# Patient Record
Sex: FEMALE
[De-identification: names, ages, dates, MRNs, and addresses within clinical notes are randomized per-mention and may not be internally consistent; named-entity substitution may affect disease eponyms.]

---

## 2019-01-02 PROBLEM — Z00.00 ENCOUNTER FOR PREVENTIVE HEALTH EXAMINATION: Status: ACTIVE | Noted: 2019-01-02

## 2019-01-22 ENCOUNTER — APPOINTMENT (OUTPATIENT)
Dept: PULMONOLOGY | Facility: CLINIC | Age: 29
End: 2019-01-22
Payer: MEDICAID

## 2019-01-22 VITALS
RESPIRATION RATE: 16 BRPM | TEMPERATURE: 97.8 F | SYSTOLIC BLOOD PRESSURE: 132 MMHG | BODY MASS INDEX: 28.56 KG/M2 | HEART RATE: 82 BPM | WEIGHT: 182 LBS | DIASTOLIC BLOOD PRESSURE: 83 MMHG | HEIGHT: 67 IN

## 2019-01-22 DIAGNOSIS — R68.89 OTHER GENERAL SYMPTOMS AND SIGNS: ICD-10-CM

## 2019-01-22 PROCEDURE — 99203 OFFICE O/P NEW LOW 30 MIN: CPT | Mod: 25

## 2019-01-22 PROCEDURE — 94010 BREATHING CAPACITY TEST: CPT

## 2019-01-22 PROCEDURE — ZZZZZ: CPT

## 2019-01-22 NOTE — ASSESSMENT
[FreeTextEntry1] : The patient has had no recent chest x-ray which we will have to obtain at next appointment. She was unable to perform lung function studies. I renewed all of her medications and she will see us in followup.

## 2019-01-22 NOTE — HISTORY OF PRESENT ILLNESS
[FreeTextEntry1] : 28-year-old developmentally disabled female whose mother answered most of my questions. She has been diagnosed as having asthma since childhood and is on medications daily. She has been intubated and ventilated on 2 occasions. Currently she feels well and is controlled with Symbicort, Ventolin, albuterol solution and Singulair. She has visual problems and will be seeing an ophthalmologist and dental issues for which she will see a dentist. The family has recently moved here and is establishing care.\par According to the patient's mother, there are no other significant medical issues.

## 2019-01-22 NOTE — PHYSICAL EXAM
[General Appearance - In No Acute Distress] : no acute distress [Normal Conjunctiva] : the conjunctiva exhibited no abnormalities [II] : II [Neck Appearance] : the appearance of the neck was normal [Heart Rate And Rhythm] : heart rate and rhythm were normal [Heart Sounds] : normal S1 and S2 [] : no respiratory distress [Respiration, Rhythm And Depth] : normal respiratory rhythm and effort [Auscultation Breath Sounds / Voice Sounds] : lungs were clear to auscultation bilaterally [Abnormal Walk] : normal gait [Nail Clubbing] : no clubbing of the fingernails [Cyanosis, Localized] : no localized cyanosis [FreeTextEntry1] : enlarged right tonsil

## 2019-01-23 ENCOUNTER — APPOINTMENT (OUTPATIENT)
Age: 29
End: 2019-01-23

## 2019-01-23 ENCOUNTER — APPOINTMENT (OUTPATIENT)
Dept: PULMONOLOGY | Facility: CLINIC | Age: 29
End: 2019-01-23

## 2019-02-14 ENCOUNTER — APPOINTMENT (OUTPATIENT)
Dept: OPHTHALMOLOGY | Facility: CLINIC | Age: 29
End: 2019-02-14

## 2019-02-20 ENCOUNTER — APPOINTMENT (OUTPATIENT)
Dept: PULMONOLOGY | Facility: CLINIC | Age: 29
End: 2019-02-20

## 2019-02-21 ENCOUNTER — APPOINTMENT (OUTPATIENT)
Dept: PULMONOLOGY | Facility: CLINIC | Age: 29
End: 2019-02-21

## 2019-03-29 ENCOUNTER — MEDICATION RENEWAL (OUTPATIENT)
Age: 29
End: 2019-03-29

## 2019-04-01 ENCOUNTER — RX CHANGE (OUTPATIENT)
Age: 29
End: 2019-04-01

## 2019-04-01 RX ORDER — PREDNISONE 10 MG/1
10 TABLET ORAL
Qty: 30 | Refills: 0 | Status: DISCONTINUED | COMMUNITY
Start: 2019-04-01 | End: 2019-04-01

## 2019-05-21 ENCOUNTER — RX CHANGE (OUTPATIENT)
Age: 29
End: 2019-05-21

## 2019-06-26 ENCOUNTER — APPOINTMENT (OUTPATIENT)
Dept: PULMONOLOGY | Facility: CLINIC | Age: 29
End: 2019-06-26
Payer: MEDICAID

## 2019-06-26 VITALS
HEART RATE: 90 BPM | RESPIRATION RATE: 15 BRPM | BODY MASS INDEX: 35.73 KG/M2 | TEMPERATURE: 97.3 F | DIASTOLIC BLOOD PRESSURE: 77 MMHG | WEIGHT: 182 LBS | SYSTOLIC BLOOD PRESSURE: 122 MMHG | HEIGHT: 60 IN | OXYGEN SATURATION: 99 %

## 2019-06-26 PROCEDURE — 99213 OFFICE O/P EST LOW 20 MIN: CPT | Mod: GC

## 2019-06-26 RX ORDER — PREDNISONE 10 MG/1
10 TABLET ORAL
Qty: 30 | Refills: 0 | Status: DISCONTINUED | COMMUNITY
Start: 2019-04-01 | End: 2019-06-26

## 2019-06-26 RX ORDER — PREDNISONE 20 MG/1
20 TABLET ORAL
Qty: 15 | Refills: 0 | Status: DISCONTINUED | COMMUNITY
Start: 2019-03-29 | End: 2019-06-26

## 2019-06-26 NOTE — REVIEW OF SYSTEMS
[As Noted in HPI] : as noted in HPI [Itchy Eyes] : itching of ~T the eyes [Negative] : Neurologic [Fever] : no fever [Chills] : no chills [Ear Disturbance] : no ear disturbance [Nasal Congestion] : no nasal congestion [Sore Throat] : no sore throat [Dry Mouth] : no dry mouth [Mouth Ulcers] : no mouth ulcers [Cough] : no cough [Sputum] : not coughing up ~M sputum [Hemoptysis] : no hemoptysis [Dyspnea] : no dyspnea [Pleuritic Pain] : no pleuritic pain [Wheezing] : no wheezing [Hypertension] : no ~T hypertension [Hypotension] : no hypotension [Murmurs] : no murmurs were heard [Palpitations] : no palpitations

## 2019-06-26 NOTE — ASSESSMENT
[FreeTextEntry1] : 29F with controlled moderate persistent asthma.\par \par - Last PFT was ideal as patient not able to follow instructions.\par - Will continue Symbicort BID and Albuterol nebulizer prn.\par - Will give patient Prednisone for exacerbations. Mother knows to start with 4 pills for several days and taper as symptoms improve.\par - She will call the clinic is symptoms do not improve.\par - Claritin and Singulair for seasonal allergy relief.\par - RTC in 3 months.

## 2019-06-26 NOTE — PHYSICAL EXAM
[General Appearance - Well Developed] : well developed [Normal Appearance] : normal appearance [General Appearance - Well Nourished] : well nourished [No Deformities] : no deformities [Normal Oropharynx] : normal oropharynx [II] : II [Neck Cervical Mass (___cm)] : no neck mass was observed [Jugular Venous Distention Increased] : there was no jugular-venous distention [Thyroid Diffuse Enlargement] : the thyroid was not enlarged [Thyroid Nodule] : there were no palpable thyroid nodules [Heart Rate And Rhythm] : heart rate and rhythm were normal [Heart Sounds] : normal S1 and S2 [Arterial Pulses Normal] : the arterial pulses were normal [Edema] : no peripheral edema present [Veins - Varicosity Changes] : no varicosital changes were noted in the lower extremities [Respiration, Rhythm And Depth] : normal respiratory rhythm and effort [Auscultation Breath Sounds / Voice Sounds] : lungs were clear to auscultation bilaterally [Abdomen Soft] : soft [Abdomen Tenderness] : non-tender [Abnormal Walk] : normal gait [Nail Clubbing] : no clubbing of the fingernails [Cyanosis, Localized] : no localized cyanosis [] : no rash [Sensation] : the sensory exam was normal to light touch and pinprick [Motor Exam] : the motor exam was normal [Oriented To Time, Place, And Person] : oriented to person, place, and time

## 2019-06-26 NOTE — HISTORY OF PRESENT ILLNESS
[FreeTextEntry1] : 29F with developmental delay, moderate persistent asthma with h/o intubations presents for follow up.\par \par Last hospitalization was 2 years ago. \par Patient is doing well on Symbicort BID and as needed  Albuterol nebulizer.\par She has no night time symptoms.\par She is on Singulair and will be adding Loratadine for seasonal allergy symptoms.\par \par They are planning a trip to the [facility] in the summer.\par \par Patient has some vision issues and will be seeing an Ophthalmologists in [place] soon. She also was seen by Optho in [facility] and was referred to [place].\par \par She was born at full term with a difficult vaginal delivery and had recurrent PNA and cold infections per mother.

## 2019-10-07 ENCOUNTER — APPOINTMENT (OUTPATIENT)
Dept: PULMONOLOGY | Facility: CLINIC | Age: 29
End: 2019-10-07

## 2019-10-07 ENCOUNTER — APPOINTMENT (OUTPATIENT)
Dept: PULMONOLOGY | Facility: CLINIC | Age: 29
End: 2019-10-07
Payer: MEDICAID

## 2019-10-15 ENCOUNTER — APPOINTMENT (OUTPATIENT)
Dept: PULMONOLOGY | Facility: CLINIC | Age: 29
End: 2019-10-15
Payer: MEDICAID

## 2019-10-15 VITALS
TEMPERATURE: 97.6 F | HEART RATE: 81 BPM | BODY MASS INDEX: 36.91 KG/M2 | OXYGEN SATURATION: 99 % | RESPIRATION RATE: 16 BRPM | DIASTOLIC BLOOD PRESSURE: 80 MMHG | WEIGHT: 189 LBS | SYSTOLIC BLOOD PRESSURE: 133 MMHG

## 2019-10-15 PROCEDURE — 99213 OFFICE O/P EST LOW 20 MIN: CPT

## 2019-10-15 NOTE — REVIEW OF SYSTEMS
[Itchy Eyes] : itching of ~T the eyes [Negative] : Neurologic [Nasal Congestion] : nasal congestion [Sore Throat] : sore throat [Cough] : cough [Fever] : no fever [Chills] : no chills [Sputum] : not coughing up ~M sputum [Hemoptysis] : no hemoptysis [Dyspnea] : no dyspnea [Pleuritic Pain] : no pleuritic pain [Hypertension] : no ~T hypertension [Wheezing] : no wheezing [Murmurs] : no murmurs were heard [Hypotension] : no hypotension [Palpitations] : no palpitations [Nasal Discharge] : no nasal discharge

## 2019-10-15 NOTE — PHYSICAL EXAM
[II] : II [Normal Oropharynx] : normal oropharynx [Jugular Venous Distention Increased] : there was no jugular-venous distention [Neck Cervical Mass (___cm)] : no neck mass was observed [Heart Rate And Rhythm] : heart rate and rhythm were normal [Arterial Pulses Normal] : the arterial pulses were normal [Heart Sounds] : normal S1 and S2 [Edema] : no peripheral edema present [Respiration, Rhythm And Depth] : normal respiratory rhythm and effort [Auscultation Breath Sounds / Voice Sounds] : lungs were clear to auscultation bilaterally [Abnormal Walk] : normal gait [Nail Clubbing] : no clubbing of the fingernails [] : no rash [Cyanosis, Localized] : no localized cyanosis [Sensation] : the sensory exam was normal to light touch and pinprick [Motor Exam] : the motor exam was normal [Oriented To Time, Place, And Person] : oriented to person, place, and time [General Appearance - In No Acute Distress] : no acute distress [Normal Conjunctiva] : the conjunctiva exhibited no abnormalities [Murmurs] : no murmurs present [Exaggerated Use Of Accessory Muscles For Inspiration] : no accessory muscle use

## 2019-10-15 NOTE — HISTORY OF PRESENT ILLNESS
[FreeTextEntry1] : 30yo female with developmental delay, moderate persistent asthma requiring intubations in the past.  Maintained on Symbicort, albuterol and Singulair.  Was last given prednisone taper in June, which she has used in the interim for symptoms of what seems like allergic rhinitis.  Today, she reports a dry cough that is worse in the afternoon/evening time and with talking/laughing.

## 2019-10-15 NOTE — ASSESSMENT
[FreeTextEntry1] : 30yo female with moderate persistent asthma presenting with a cough.  Cough sounds upper airway and will try nasal fluticasone for the next 4 weeks.  If unimproved, patient's mother to call our office in f/u.  Asthma symptoms controlled at this time.  C/w Symbicort, albuterol, and Singulair.  Will hold off on refilling prednisone today.

## 2019-11-13 ENCOUNTER — RX RENEWAL (OUTPATIENT)
Age: 29
End: 2019-11-13

## 2020-09-23 ENCOUNTER — APPOINTMENT (OUTPATIENT)
Dept: PULMONOLOGY | Facility: CLINIC | Age: 30
End: 2020-09-23
Payer: MEDICAID

## 2020-09-23 VITALS
BODY MASS INDEX: 39.33 KG/M2 | TEMPERATURE: 98.1 F | RESPIRATION RATE: 17 BRPM | SYSTOLIC BLOOD PRESSURE: 121 MMHG | WEIGHT: 201.38 LBS | OXYGEN SATURATION: 97 % | HEART RATE: 84 BPM | DIASTOLIC BLOOD PRESSURE: 80 MMHG

## 2020-09-23 DIAGNOSIS — J45.40 MODERATE PERSISTENT ASTHMA, UNCOMPLICATED: ICD-10-CM

## 2020-09-23 DIAGNOSIS — J30.2 OTHER SEASONAL ALLERGIC RHINITIS: ICD-10-CM

## 2020-09-23 DIAGNOSIS — R05 COUGH: ICD-10-CM

## 2020-09-23 PROCEDURE — 99213 OFFICE O/P EST LOW 20 MIN: CPT

## 2020-09-23 RX ORDER — MONTELUKAST 10 MG/1
10 TABLET, FILM COATED ORAL
Qty: 30 | Refills: 5 | Status: ACTIVE | COMMUNITY

## 2020-09-23 RX ORDER — SODIUM CHLORIDE 0.65 %
0.65 AEROSOL, SPRAY (ML) NASAL TWICE DAILY
Qty: 1 | Refills: 6 | Status: ACTIVE | COMMUNITY
Start: 2020-09-23 | End: 1900-01-01

## 2020-09-23 RX ORDER — FLUTICASONE PROPIONATE 50 UG/1
50 SPRAY, METERED NASAL TWICE DAILY
Qty: 1 | Refills: 2 | Status: ACTIVE | COMMUNITY
Start: 2019-10-15

## 2020-09-23 RX ORDER — ALBUTEROL SULFATE 2.5 MG/3ML
(2.5 MG/3ML) SOLUTION RESPIRATORY (INHALATION) 4 TIMES DAILY
Qty: 2 | Refills: 3 | Status: ACTIVE | COMMUNITY

## 2020-09-23 RX ORDER — LORATADINE 10 MG/1
10 TABLET ORAL DAILY
Qty: 30 | Refills: 3 | Status: ACTIVE | COMMUNITY
Start: 2019-06-26

## 2020-09-23 NOTE — ASSESSMENT
[FreeTextEntry1] : 30 year old female with developmental delay with moderate persistent asthma presents a follow up.  Asthma is well controlled with current medications.  Symbicort, singulair, loratidine, albuterol have been refilled.  Pt will follow up as needed.

## 2020-09-23 NOTE — END OF VISIT
[FreeTextEntry3] : I was present for the key portions of the history and physical examination elicited by the nurse practitioner. I agree with the assessment and plan as written\par

## 2020-09-23 NOTE — PHYSICAL EXAM
[No Acute Distress] : no acute distress [Normal Appearance] : normal appearance [Normal Rate/Rhythm] : normal rate/rhythm [Normal S1, S2] : normal s1, s2 [No Murmurs] : no murmurs [No Resp Distress] : no resp distress [Clear to Auscultation Bilaterally] : clear to auscultation bilaterally [No Abnormalities] : no abnormalities [Benign] : benign [Normal Gait] : normal gait [No Clubbing] : no clubbing [No Cyanosis] : no cyanosis [No Edema] : no edema [Normal Color/ Pigmentation] : normal color/ pigmentation [No Focal Deficits] : no focal deficits [Oriented x3] : oriented x3 [Normal Affect] : normal affect

## 2020-09-23 NOTE — HISTORY OF PRESENT ILLNESS
[TextBox_4] : 30 year old female with developmental delay with moderate persistent asthma presents a follow up.  She has been taking Symbicort, Singulair, loratidine, flonase, and albuterol.  Pt's mother states that her asthma has been very well controlled.  Denies any cough, wheezing, shortness of breath or chest discomfort.  She does get a little more nasal congestion in the  colder weather but so far has been doing well.

## 2021-10-01 ENCOUNTER — NON-APPOINTMENT (OUTPATIENT)
Age: 31
End: 2021-10-01

## 2021-10-01 ENCOUNTER — APPOINTMENT (OUTPATIENT)
Dept: OPHTHALMOLOGY | Facility: CLINIC | Age: 31
End: 2021-10-01
Payer: MEDICAID

## 2021-10-01 PROCEDURE — 92002 INTRM OPH EXAM NEW PATIENT: CPT

## 2021-12-03 ENCOUNTER — RX RENEWAL (OUTPATIENT)
Age: 31
End: 2021-12-03

## 2022-01-01 ENCOUNTER — RX RENEWAL (OUTPATIENT)
Age: 32
End: 2022-01-01

## 2022-02-18 ENCOUNTER — RX RENEWAL (OUTPATIENT)
Age: 32
End: 2022-02-18

## 2022-11-17 ENCOUNTER — OFFICE (OUTPATIENT)
Dept: URBAN - METROPOLITAN AREA CLINIC 70 | Facility: CLINIC | Age: 32
Setting detail: OPHTHALMOLOGY
End: 2022-11-17
Payer: COMMERCIAL

## 2022-11-17 DIAGNOSIS — H18.623: ICD-10-CM

## 2022-11-17 PROCEDURE — 92012 INTRM OPH EXAM EST PATIENT: CPT | Performed by: OPHTHALMOLOGY

## 2022-11-17 ASSESSMENT — CONFRONTATIONAL VISUAL FIELD TEST (CVF)
OD_FINDINGS: FULL
OS_FINDINGS: FULL

## 2022-11-17 ASSESSMENT — SPHEQUIV_DERIVED: OD_SPHEQUIV: -1.125

## 2022-11-17 ASSESSMENT — KERATOMETRY
OD_AXISANGLE_DEGREES: 119
OD_K1POWER_DIOPTERS: 39.75
OS_K1POWER_DIOPTERS: UNABLE
OD_K2POWER_DIOPTERS: 44.25

## 2022-11-17 ASSESSMENT — REFRACTION_AUTOREFRACTION
OD_SPHERE: +2.50
OD_AXIS: 025
OD_CYLINDER: -7.25
OS_SPHERE: UNABLE

## 2022-11-17 ASSESSMENT — CORNEAL SURGICAL SCARRING: OS_SCARRING: STROMAL

## 2022-11-17 ASSESSMENT — AXIALLENGTH_DERIVED: OD_AL: 24.6245

## 2022-11-17 ASSESSMENT — VISUAL ACUITY
OD_BCVA: 20/40
OS_BCVA: 20/30

## 2023-06-08 ENCOUNTER — OFFICE (OUTPATIENT)
Dept: URBAN - METROPOLITAN AREA CLINIC 104 | Facility: CLINIC | Age: 33
Setting detail: OPHTHALMOLOGY
End: 2023-06-08
Payer: COMMERCIAL

## 2023-06-08 DIAGNOSIS — H18.623: ICD-10-CM

## 2023-06-08 DIAGNOSIS — H18.622: ICD-10-CM

## 2023-06-08 DIAGNOSIS — H18.621: ICD-10-CM

## 2023-06-08 PROCEDURE — 99024 POSTOP FOLLOW-UP VISIT: CPT | Performed by: OPHTHALMOLOGY

## 2023-06-08 ASSESSMENT — KERATOMETRY
OS_K1POWER_DIOPTERS: UNABLE
OD_K1POWER_DIOPTERS: UNABLE

## 2023-06-08 ASSESSMENT — VISUAL ACUITY
OS_BCVA: 20/30
OD_BCVA: 20/40

## 2023-06-08 ASSESSMENT — REFRACTION_AUTOREFRACTION
OD_SPHERE: UNABLE
OS_SPHERE: UNABLE

## 2023-06-08 ASSESSMENT — CORNEAL SURGICAL SCARRING: OS_SCARRING: STROMAL

## 2023-06-21 ENCOUNTER — AMBUL SURGICAL CARE (OUTPATIENT)
Dept: URBAN - METROPOLITAN AREA CLINIC 18 | Facility: CLINIC | Age: 33
Setting detail: OPHTHALMOLOGY
End: 2023-06-21
Payer: COMMERCIAL

## 2023-06-21 DIAGNOSIS — Z94.7: ICD-10-CM

## 2023-06-21 PROCEDURE — 66250 FOLLOW-UP SURGERY OF EYE: CPT | Performed by: OPHTHALMOLOGY

## 2023-06-30 DIAGNOSIS — S82.209A UNSPECIFIED FRACTURE OF SHAFT OF UNSPECIFIED TIBIA, INITIAL ENCOUNTER FOR CLOSED FRACTURE: ICD-10-CM

## 2023-07-05 ENCOUNTER — FORM ENCOUNTER (OUTPATIENT)
Age: 33
End: 2023-07-05

## 2023-07-20 ENCOUNTER — APPOINTMENT (OUTPATIENT)
Dept: ORTHOPEDIC SURGERY | Facility: CLINIC | Age: 33
End: 2023-07-20
Payer: MEDICAID

## 2023-07-20 VITALS — HEIGHT: 67 IN | WEIGHT: 200 LBS | BODY MASS INDEX: 31.39 KG/M2

## 2023-07-20 DIAGNOSIS — J45.909 UNSPECIFIED ASTHMA, UNCOMPLICATED: ICD-10-CM

## 2023-07-20 PROCEDURE — 73590 X-RAY EXAM OF LOWER LEG: CPT | Mod: LT

## 2023-07-20 PROCEDURE — 99024 POSTOP FOLLOW-UP VISIT: CPT

## 2023-07-20 RX ORDER — PREDNISOLONE ACETATE 10 MG/ML
1 SUSPENSION/ DROPS OPHTHALMIC
Refills: 0 | Status: ACTIVE | COMMUNITY

## 2023-07-20 RX ORDER — CEFPODOXIME PROXETIL 100 MG/1
100 TABLET, FILM COATED ORAL
Refills: 0 | Status: ACTIVE | COMMUNITY

## 2023-07-20 RX ORDER — BUDESONIDE AND FORMOTEROL FUMARATE DIHYDRATE 160; 4.5 UG/1; UG/1
160-4.5 AEROSOL RESPIRATORY (INHALATION)
Refills: 0 | Status: ACTIVE | COMMUNITY

## 2023-07-20 RX ORDER — PREDNISONE 10 MG/1
10 TABLET ORAL
Qty: 50 | Refills: 0 | Status: DISCONTINUED | COMMUNITY
Start: 2019-06-26 | End: 2023-07-20

## 2023-07-20 RX ORDER — ALBUTEROL SULFATE 90 UG/1
108 (90 BASE) AEROSOL, METERED RESPIRATORY (INHALATION)
Qty: 1 | Refills: 6 | Status: DISCONTINUED | COMMUNITY
End: 2023-07-20

## 2023-07-20 RX ORDER — MULTIVIT-MIN/FOLIC/VIT K/LYCOP 400-300MCG
500 TABLET ORAL
Refills: 0 | Status: ACTIVE | COMMUNITY

## 2023-07-20 RX ORDER — OFLOXACIN 3 MG/ML
0.3 SOLUTION/ DROPS OPHTHALMIC
Refills: 0 | Status: ACTIVE | COMMUNITY

## 2023-07-20 RX ORDER — BUDESONIDE AND FORMOTEROL FUMARATE DIHYDRATE 160; 4.5 UG/1; UG/1
160-4.5 AEROSOL RESPIRATORY (INHALATION) TWICE DAILY
Qty: 1 | Refills: 0 | Status: DISCONTINUED | COMMUNITY
End: 2023-07-20

## 2023-07-20 NOTE — ASSESSMENT
[FreeTextEntry1] : Patient given RX for long cam walking boot \par \par WBAT \par \par Recommend: - rest - ice - compression - elevation \par \par Continue PT and OT \par \par Follow up in 1 month

## 2023-07-20 NOTE — IMAGING
[Left] : left tibia/fibula [No loss of surgical correlation. Bony alignment acceptable. Hardware in appropriate position] : No loss of surgical correlation. Bony alignment acceptable. Hardware in appropriate position

## 2023-07-20 NOTE — HISTORY OF PRESENT ILLNESS
[Left Leg] : left leg [] : yes [de-identified] : Patient is S/P left tib/fib fracture repair 6/28/23. Patient was seen in [facility] ER after falling in the bathroom on 6/25/23. Patient is unable to communicate on her own. Mother state's that her daughter is in pain. States she was getting OT/PT sessions but ran out. States she finished the Oxycodone- Acetaminophen 5- 325mg. States she has been given Tylenol for pain with no relief. \par \par Patient is using wheelchair for ambulation.

## 2023-08-31 ENCOUNTER — APPOINTMENT (OUTPATIENT)
Dept: ORTHOPEDIC SURGERY | Facility: CLINIC | Age: 33
End: 2023-08-31
Payer: MEDICAID

## 2023-08-31 VITALS — HEIGHT: 67 IN | BODY MASS INDEX: 31.39 KG/M2 | WEIGHT: 200 LBS

## 2023-08-31 PROCEDURE — 73590 X-RAY EXAM OF LOWER LEG: CPT | Mod: LT

## 2023-08-31 PROCEDURE — 99024 POSTOP FOLLOW-UP VISIT: CPT

## 2023-08-31 NOTE — HISTORY OF PRESENT ILLNESS
[de-identified] : S/P left tib/fib fracture repair 6/28/23. Patient states she has intermittent, throbbing pain. Patient has been having some issues with her balance trying to adjust to the cam boot. Patient has been unable to go to OT/PT due to mother having health issues. Patient admits to taking Tylenol for pain. Patient is wearing cam boot and using walker for ambulation.

## 2023-08-31 NOTE — ASSESSMENT
[FreeTextEntry1] : DC long cam walking boot.  WBAT   Recommend: - rest - ice - compression - elevation   Begin PT and OT   Follow up in 2 months

## 2023-08-31 NOTE — IMAGING
[Left] : left tibia/fibula [The fracture is in acceptable alignment. There is progression in healing seen] : The fracture is in acceptable alignment. There is progression in healing seen [de-identified] : Hardware in place

## 2023-09-14 ENCOUNTER — OFFICE (OUTPATIENT)
Dept: URBAN - METROPOLITAN AREA CLINIC 104 | Facility: CLINIC | Age: 33
Setting detail: OPHTHALMOLOGY
End: 2023-09-14
Payer: COMMERCIAL

## 2023-09-14 DIAGNOSIS — H18.623: ICD-10-CM

## 2023-09-14 PROCEDURE — 99213 OFFICE O/P EST LOW 20 MIN: CPT | Performed by: OPHTHALMOLOGY

## 2023-09-14 ASSESSMENT — CORNEAL SURGICAL SCARRING: OS_SCARRING: STROMAL

## 2023-09-14 ASSESSMENT — KERATOMETRY
OD_K1POWER_DIOPTERS: UNABLE
OS_K1POWER_DIOPTERS: UNABLE

## 2023-09-14 ASSESSMENT — REFRACTION_AUTOREFRACTION
OD_CYLINDER: -3.75
OS_SPHERE: UNABLE
OD_AXIS: 044
OD_SPHERE: +0.75

## 2023-09-14 ASSESSMENT — CONFRONTATIONAL VISUAL FIELD TEST (CVF)
OD_FINDINGS: FULL
OS_FINDINGS: FULL

## 2023-09-14 ASSESSMENT — VISUAL ACUITY
OS_BCVA: 20/30
OD_BCVA: 20/30

## 2023-09-14 ASSESSMENT — SPHEQUIV_DERIVED: OD_SPHEQUIV: -1.125

## 2023-09-14 ASSESSMENT — TONOMETRY
OD_IOP_MMHG: 15
OS_IOP_MMHG: 15

## 2023-09-20 RX ORDER — OXYCODONE AND ACETAMINOPHEN 5; 325 MG/1; MG/1
5-325 TABLET ORAL
Qty: 30 | Refills: 0 | Status: ACTIVE | COMMUNITY
Start: 2023-07-20 | End: 1900-01-01

## 2023-10-06 ENCOUNTER — AMBUL SURGICAL CARE (OUTPATIENT)
Dept: URBAN - METROPOLITAN AREA CLINIC 18 | Facility: CLINIC | Age: 33
Setting detail: OPHTHALMOLOGY
End: 2023-10-06
Payer: COMMERCIAL

## 2023-10-06 DIAGNOSIS — H17.9: ICD-10-CM

## 2023-10-06 PROCEDURE — 92018 COMPL OPH EXAM GENERAL ANES: CPT | Mod: RT | Performed by: OPHTHALMOLOGY

## 2023-11-02 ENCOUNTER — APPOINTMENT (OUTPATIENT)
Dept: ORTHOPEDIC SURGERY | Facility: CLINIC | Age: 33
End: 2023-11-02
Payer: MEDICAID

## 2023-11-02 VITALS — WEIGHT: 200 LBS | BODY MASS INDEX: 31.39 KG/M2 | HEIGHT: 67 IN

## 2023-11-02 DIAGNOSIS — S82.832D OTHER FRACTURE OF UPPER AND LOWER END OF LEFT FIBULA, SUBSEQUENT ENCOUNTER FOR CLOSED FRACTURE WITH ROUTINE HEALING: ICD-10-CM

## 2023-11-02 DIAGNOSIS — S82.242D DISPLACED SPIRAL FRACTURE OF SHAFT OF LEFT TIBIA, SUBSEQUENT ENCOUNTER FOR CLOSED FRACTURE WITH ROUTINE HEALING: ICD-10-CM

## 2023-11-02 PROCEDURE — 73590 X-RAY EXAM OF LOWER LEG: CPT | Mod: LT

## 2023-11-02 PROCEDURE — 99213 OFFICE O/P EST LOW 20 MIN: CPT | Mod: 25

## 2023-11-06 ENCOUNTER — OFFICE (OUTPATIENT)
Dept: URBAN - METROPOLITAN AREA CLINIC 104 | Facility: CLINIC | Age: 33
Setting detail: OPHTHALMOLOGY
End: 2023-11-06
Payer: COMMERCIAL

## 2023-11-06 ENCOUNTER — RX ONLY (RX ONLY)
Age: 33
End: 2023-11-06

## 2023-11-06 DIAGNOSIS — H17.9: ICD-10-CM

## 2023-11-06 DIAGNOSIS — H18.623: ICD-10-CM

## 2023-11-06 PROCEDURE — 99213 OFFICE O/P EST LOW 20 MIN: CPT | Performed by: OPHTHALMOLOGY

## 2023-11-06 ASSESSMENT — REFRACTION_AUTOREFRACTION
OD_SPHERE: UNABLE
OS_SPHERE: UNABLE

## 2023-11-06 ASSESSMENT — CONFRONTATIONAL VISUAL FIELD TEST (CVF)
OD_FINDINGS: FULL
OS_FINDINGS: FULL

## 2023-11-06 ASSESSMENT — CORNEAL SURGICAL SCARRING: OS_SCARRING: STROMAL

## 2023-11-15 ENCOUNTER — AMBULATORY SURGERY CENTER (OUTPATIENT)
Dept: URBAN - METROPOLITAN AREA SURGERY 26 | Facility: SURGERY | Age: 33
Setting detail: OPHTHALMOLOGY
End: 2023-11-15
Payer: COMMERCIAL

## 2023-11-15 DIAGNOSIS — Z94.7: ICD-10-CM

## 2023-11-15 PROCEDURE — 92018 COMPL OPH EXAM GENERAL ANES: CPT | Mod: RT | Performed by: OPHTHALMOLOGY

## 2023-11-15 PROCEDURE — 66250 FOLLOW-UP SURGERY OF EYE: CPT | Mod: RT | Performed by: OPHTHALMOLOGY

## 2023-11-16 ENCOUNTER — OFFICE (OUTPATIENT)
Dept: URBAN - METROPOLITAN AREA CLINIC 104 | Facility: CLINIC | Age: 33
Setting detail: OPHTHALMOLOGY
End: 2023-11-16
Payer: COMMERCIAL

## 2023-11-16 DIAGNOSIS — H18.621: ICD-10-CM

## 2023-11-16 DIAGNOSIS — H17.9: ICD-10-CM

## 2023-11-16 DIAGNOSIS — H18.622: ICD-10-CM

## 2023-11-16 DIAGNOSIS — H18.623: ICD-10-CM

## 2023-11-16 PROCEDURE — 99024 POSTOP FOLLOW-UP VISIT: CPT | Performed by: OPTOMETRIST

## 2023-11-16 ASSESSMENT — CONFRONTATIONAL VISUAL FIELD TEST (CVF)
OD_FINDINGS: FULL
OS_FINDINGS: FULL

## 2023-11-16 ASSESSMENT — REFRACTION_AUTOREFRACTION: OD_SPHERE: UNABLE

## 2023-11-16 ASSESSMENT — CORNEAL SURGICAL SCARRING: OS_SCARRING: STROMAL

## 2023-11-20 ENCOUNTER — OFFICE (OUTPATIENT)
Dept: URBAN - METROPOLITAN AREA CLINIC 104 | Facility: CLINIC | Age: 33
Setting detail: OPHTHALMOLOGY
End: 2023-11-20
Payer: COMMERCIAL

## 2023-11-20 ENCOUNTER — RX ONLY (RX ONLY)
Age: 33
End: 2023-11-20

## 2023-11-20 DIAGNOSIS — H17.9: ICD-10-CM

## 2023-11-20 DIAGNOSIS — H18.621: ICD-10-CM

## 2023-11-20 DIAGNOSIS — H18.623: ICD-10-CM

## 2023-11-20 DIAGNOSIS — H18.622: ICD-10-CM

## 2023-11-20 PROCEDURE — 99024 POSTOP FOLLOW-UP VISIT: CPT | Performed by: OPHTHALMOLOGY

## 2023-11-20 ASSESSMENT — CONFRONTATIONAL VISUAL FIELD TEST (CVF)
OD_FINDINGS: FULL
OS_FINDINGS: FULL

## 2023-11-20 ASSESSMENT — CORNEAL SURGICAL SCARRING: OS_SCARRING: STROMAL

## 2023-11-20 ASSESSMENT — REFRACTION_AUTOREFRACTION: OD_SPHERE: UNABLE
